# Patient Record
Sex: MALE | Race: WHITE | Employment: UNEMPLOYED | URBAN - METROPOLITAN AREA
[De-identification: names, ages, dates, MRNs, and addresses within clinical notes are randomized per-mention and may not be internally consistent; named-entity substitution may affect disease eponyms.]

---

## 2017-10-14 ENCOUNTER — HOSPITAL ENCOUNTER (OUTPATIENT)
Dept: NON INVASIVE DIAGNOSTICS | Age: 32
Discharge: HOME | End: 2017-10-14
Attending: INTERNAL MEDICINE | Admitting: INTERNAL MEDICINE

## 2017-10-14 LAB
LV EF: 50 %
LVEF MODALITY: NORMAL

## 2018-11-08 ENCOUNTER — APPOINTMENT (OUTPATIENT)
Dept: CT IMAGING | Age: 33
DRG: 134 | End: 2018-11-08
Payer: COMMERCIAL

## 2018-11-08 ENCOUNTER — HOSPITAL ENCOUNTER (INPATIENT)
Age: 33
LOS: 3 days | Discharge: HOME | DRG: 134 | End: 2018-11-11
Attending: EMERGENCY MEDICINE | Admitting: INTERNAL MEDICINE
Payer: COMMERCIAL

## 2018-11-08 ENCOUNTER — APPOINTMENT (OUTPATIENT)
Dept: GENERAL RADIOLOGY | Age: 33
DRG: 134 | End: 2018-11-08
Payer: COMMERCIAL

## 2018-11-08 DIAGNOSIS — I26.99 OTHER ACUTE PULMONARY EMBOLISM WITHOUT ACUTE COR PULMONALE (HCC): Primary | ICD-10-CM

## 2018-11-08 LAB
A/G RATIO: 0.8 (ref 1.1–2.2)
ALBUMIN SERPL-MCNC: 3.4 G/DL (ref 3.4–5)
ALP BLD-CCNC: 50 U/L (ref 40–129)
ALT SERPL-CCNC: 40 U/L (ref 10–40)
ANION GAP SERPL CALCULATED.3IONS-SCNC: 11 MMOL/L (ref 3–16)
APTT: 25.1 SEC (ref 26–36)
APTT: 41.4 SEC (ref 26–36)
AST SERPL-CCNC: 33 U/L (ref 15–37)
BASOPHILS ABSOLUTE: 0.1 K/UL (ref 0–0.2)
BASOPHILS RELATIVE PERCENT: 0.7 %
BILIRUB SERPL-MCNC: 0.6 MG/DL (ref 0–1)
BUN BLDV-MCNC: 8 MG/DL (ref 7–20)
CALCIUM SERPL-MCNC: 9 MG/DL (ref 8.3–10.6)
CHLORIDE BLD-SCNC: 94 MMOL/L (ref 99–110)
CO2: 26 MMOL/L (ref 21–32)
CREAT SERPL-MCNC: 0.8 MG/DL (ref 0.9–1.3)
EOSINOPHILS ABSOLUTE: 0.2 K/UL (ref 0–0.6)
EOSINOPHILS RELATIVE PERCENT: 2.4 %
GFR AFRICAN AMERICAN: >60
GFR NON-AFRICAN AMERICAN: >60
GLOBULIN: 4.2 G/DL
GLUCOSE BLD-MCNC: 98 MG/DL (ref 70–99)
HCT VFR BLD CALC: 38.1 % (ref 40.5–52.5)
HCT VFR BLD CALC: 40.6 % (ref 40.5–52.5)
HEMOGLOBIN: 13 G/DL (ref 13.5–17.5)
HEMOGLOBIN: 13.8 G/DL (ref 13.5–17.5)
INR BLD: 1.11 (ref 0.86–1.14)
LACTIC ACID: 1 MMOL/L (ref 0.4–2)
LYMPHOCYTES ABSOLUTE: 1.2 K/UL (ref 1–5.1)
LYMPHOCYTES RELATIVE PERCENT: 13.7 %
MCH RBC QN AUTO: 29.6 PG (ref 26–34)
MCH RBC QN AUTO: 29.8 PG (ref 26–34)
MCHC RBC AUTO-ENTMCNC: 34 G/DL (ref 31–36)
MCHC RBC AUTO-ENTMCNC: 34.1 G/DL (ref 31–36)
MCV RBC AUTO: 86.7 FL (ref 80–100)
MCV RBC AUTO: 87.8 FL (ref 80–100)
MONOCYTES ABSOLUTE: 0.6 K/UL (ref 0–1.3)
MONOCYTES RELATIVE PERCENT: 7.1 %
NEUTROPHILS ABSOLUTE: 6.5 K/UL (ref 1.7–7.7)
NEUTROPHILS RELATIVE PERCENT: 76.1 %
PDW BLD-RTO: 13.2 % (ref 12.4–15.4)
PDW BLD-RTO: 13.3 % (ref 12.4–15.4)
PLATELET # BLD: 154 K/UL (ref 135–450)
PLATELET # BLD: 164 K/UL (ref 135–450)
PMV BLD AUTO: 9.3 FL (ref 5–10.5)
PMV BLD AUTO: 9.4 FL (ref 5–10.5)
POTASSIUM REFLEX MAGNESIUM: 3.7 MMOL/L (ref 3.5–5.1)
PRO-BNP: 67 PG/ML (ref 0–124)
PROTHROMBIN TIME: 12.6 SEC (ref 9.8–13)
RBC # BLD: 4.34 M/UL (ref 4.2–5.9)
RBC # BLD: 4.68 M/UL (ref 4.2–5.9)
SODIUM BLD-SCNC: 131 MMOL/L (ref 136–145)
TOTAL PROTEIN: 7.6 G/DL (ref 6.4–8.2)
TROPONIN: <0.01 NG/ML
WBC # BLD: 7.2 K/UL (ref 4–11)
WBC # BLD: 8.5 K/UL (ref 4–11)

## 2018-11-08 PROCEDURE — 84484 ASSAY OF TROPONIN QUANT: CPT

## 2018-11-08 PROCEDURE — 87040 BLOOD CULTURE FOR BACTERIA: CPT

## 2018-11-08 PROCEDURE — 6360000002 HC RX W HCPCS: Performed by: INTERNAL MEDICINE

## 2018-11-08 PROCEDURE — 85730 THROMBOPLASTIN TIME PARTIAL: CPT

## 2018-11-08 PROCEDURE — 6360000002 HC RX W HCPCS: Performed by: PHYSICIAN ASSISTANT

## 2018-11-08 PROCEDURE — 2060000000 HC ICU INTERMEDIATE R&B

## 2018-11-08 PROCEDURE — 2580000003 HC RX 258: Performed by: INTERNAL MEDICINE

## 2018-11-08 PROCEDURE — 83880 ASSAY OF NATRIURETIC PEPTIDE: CPT

## 2018-11-08 PROCEDURE — 6370000000 HC RX 637 (ALT 250 FOR IP): Performed by: EMERGENCY MEDICINE

## 2018-11-08 PROCEDURE — 85027 COMPLETE CBC AUTOMATED: CPT

## 2018-11-08 PROCEDURE — 94640 AIRWAY INHALATION TREATMENT: CPT

## 2018-11-08 PROCEDURE — 85025 COMPLETE CBC W/AUTO DIFF WBC: CPT

## 2018-11-08 PROCEDURE — 94664 DEMO&/EVAL PT USE INHALER: CPT

## 2018-11-08 PROCEDURE — 6370000000 HC RX 637 (ALT 250 FOR IP): Performed by: INTERNAL MEDICINE

## 2018-11-08 PROCEDURE — 36415 COLL VENOUS BLD VENIPUNCTURE: CPT

## 2018-11-08 PROCEDURE — 83605 ASSAY OF LACTIC ACID: CPT

## 2018-11-08 PROCEDURE — 6360000004 HC RX CONTRAST MEDICATION: Performed by: PHYSICIAN ASSISTANT

## 2018-11-08 PROCEDURE — 2580000003 HC RX 258: Performed by: EMERGENCY MEDICINE

## 2018-11-08 PROCEDURE — 93010 ELECTROCARDIOGRAM REPORT: CPT | Performed by: INTERNAL MEDICINE

## 2018-11-08 PROCEDURE — 87801 DETECT AGNT MULT DNA AMPLI: CPT

## 2018-11-08 PROCEDURE — 85610 PROTHROMBIN TIME: CPT

## 2018-11-08 PROCEDURE — 93005 ELECTROCARDIOGRAM TRACING: CPT | Performed by: PHYSICIAN ASSISTANT

## 2018-11-08 PROCEDURE — 99223 1ST HOSP IP/OBS HIGH 75: CPT | Performed by: INTERNAL MEDICINE

## 2018-11-08 PROCEDURE — 96361 HYDRATE IV INFUSION ADD-ON: CPT

## 2018-11-08 PROCEDURE — 96375 TX/PRO/DX INJ NEW DRUG ADDON: CPT

## 2018-11-08 PROCEDURE — 80053 COMPREHEN METABOLIC PANEL: CPT

## 2018-11-08 PROCEDURE — 99285 EMERGENCY DEPT VISIT HI MDM: CPT

## 2018-11-08 PROCEDURE — 71260 CT THORAX DX C+: CPT

## 2018-11-08 PROCEDURE — 96366 THER/PROPH/DIAG IV INF ADDON: CPT

## 2018-11-08 PROCEDURE — 96365 THER/PROPH/DIAG IV INF INIT: CPT

## 2018-11-08 RX ORDER — SODIUM CHLORIDE 0.9 % (FLUSH) 0.9 %
10 SYRINGE (ML) INJECTION PRN
Status: DISCONTINUED | OUTPATIENT
Start: 2018-11-08 | End: 2018-11-11 | Stop reason: HOSPADM

## 2018-11-08 RX ORDER — SODIUM CHLORIDE 0.9 % (FLUSH) 0.9 %
10 SYRINGE (ML) INJECTION EVERY 12 HOURS SCHEDULED
Status: DISCONTINUED | OUTPATIENT
Start: 2018-11-08 | End: 2018-11-11 | Stop reason: HOSPADM

## 2018-11-08 RX ORDER — PROPRANOLOL HYDROCHLORIDE 20 MG/1
20 TABLET ORAL 3 TIMES DAILY
Status: DISCONTINUED | OUTPATIENT
Start: 2018-11-08 | End: 2018-11-11 | Stop reason: HOSPADM

## 2018-11-08 RX ORDER — PAROXETINE 10 MG/1
10 TABLET, FILM COATED ORAL EVERY MORNING
Status: DISCONTINUED | OUTPATIENT
Start: 2018-11-09 | End: 2018-11-11 | Stop reason: HOSPADM

## 2018-11-08 RX ORDER — HEPARIN SODIUM 1000 [USP'U]/ML
6500 INJECTION, SOLUTION INTRAVENOUS; SUBCUTANEOUS ONCE
Status: COMPLETED | OUTPATIENT
Start: 2018-11-08 | End: 2018-11-08

## 2018-11-08 RX ORDER — IPRATROPIUM BROMIDE AND ALBUTEROL SULFATE 2.5; .5 MG/3ML; MG/3ML
1 SOLUTION RESPIRATORY (INHALATION) ONCE
Status: COMPLETED | OUTPATIENT
Start: 2018-11-08 | End: 2018-11-08

## 2018-11-08 RX ORDER — LORAZEPAM 2 MG/ML
0.5 INJECTION INTRAMUSCULAR ONCE
Status: COMPLETED | OUTPATIENT
Start: 2018-11-08 | End: 2018-11-08

## 2018-11-08 RX ORDER — ONDANSETRON 2 MG/ML
4 INJECTION INTRAMUSCULAR; INTRAVENOUS EVERY 6 HOURS PRN
Status: DISCONTINUED | OUTPATIENT
Start: 2018-11-08 | End: 2018-11-11 | Stop reason: HOSPADM

## 2018-11-08 RX ORDER — HEPARIN SODIUM 1000 [USP'U]/ML
3200 INJECTION, SOLUTION INTRAVENOUS; SUBCUTANEOUS ONCE
Status: COMPLETED | OUTPATIENT
Start: 2018-11-08 | End: 2018-11-08

## 2018-11-08 RX ORDER — IBUPROFEN 400 MG/1
800 TABLET ORAL EVERY 8 HOURS PRN
Status: DISCONTINUED | OUTPATIENT
Start: 2018-11-08 | End: 2018-11-08

## 2018-11-08 RX ORDER — DOCUSATE SODIUM 100 MG/1
100 CAPSULE, LIQUID FILLED ORAL 2 TIMES DAILY
Status: DISCONTINUED | OUTPATIENT
Start: 2018-11-08 | End: 2018-11-11 | Stop reason: HOSPADM

## 2018-11-08 RX ORDER — HEPARIN SODIUM 1000 [USP'U]/ML
80 INJECTION, SOLUTION INTRAVENOUS; SUBCUTANEOUS PRN
Status: DISCONTINUED | OUTPATIENT
Start: 2018-11-08 | End: 2018-11-08

## 2018-11-08 RX ORDER — METHADONE HYDROCHLORIDE 5 MG/5ML
83 SOLUTION ORAL DAILY
Status: DISCONTINUED | OUTPATIENT
Start: 2018-11-09 | End: 2018-11-09 | Stop reason: ALTCHOICE

## 2018-11-08 RX ORDER — 0.9 % SODIUM CHLORIDE 0.9 %
30 INTRAVENOUS SOLUTION INTRAVENOUS ONCE
Status: COMPLETED | OUTPATIENT
Start: 2018-11-08 | End: 2018-11-08

## 2018-11-08 RX ORDER — FAMOTIDINE 20 MG/1
20 TABLET, FILM COATED ORAL 2 TIMES DAILY
Status: DISCONTINUED | OUTPATIENT
Start: 2018-11-08 | End: 2018-11-11 | Stop reason: HOSPADM

## 2018-11-08 RX ORDER — HEPARIN SODIUM 1000 [USP'U]/ML
40 INJECTION, SOLUTION INTRAVENOUS; SUBCUTANEOUS PRN
Status: DISCONTINUED | OUTPATIENT
Start: 2018-11-08 | End: 2018-11-08

## 2018-11-08 RX ORDER — KETOROLAC TROMETHAMINE 30 MG/ML
30 INJECTION, SOLUTION INTRAMUSCULAR; INTRAVENOUS EVERY 6 HOURS PRN
Status: DISCONTINUED | OUTPATIENT
Start: 2018-11-08 | End: 2018-11-11 | Stop reason: HOSPADM

## 2018-11-08 RX ORDER — PROPRANOLOL HYDROCHLORIDE 10 MG/1
10 TABLET ORAL ONCE
Status: COMPLETED | OUTPATIENT
Start: 2018-11-08 | End: 2018-11-08

## 2018-11-08 RX ORDER — CLONAZEPAM 0.5 MG/1
0.5 TABLET ORAL 4 TIMES DAILY PRN
Status: DISCONTINUED | OUTPATIENT
Start: 2018-11-08 | End: 2018-11-11 | Stop reason: HOSPADM

## 2018-11-08 RX ORDER — KETOROLAC TROMETHAMINE 30 MG/ML
30 INJECTION, SOLUTION INTRAMUSCULAR; INTRAVENOUS ONCE
Status: COMPLETED | OUTPATIENT
Start: 2018-11-08 | End: 2018-11-08

## 2018-11-08 RX ORDER — HEPARIN SODIUM 10000 [USP'U]/100ML
17.8 INJECTION, SOLUTION INTRAVENOUS CONTINUOUS
Status: DISPENSED | OUTPATIENT
Start: 2018-11-08 | End: 2018-11-11

## 2018-11-08 RX ADMIN — Medication 10 ML: at 20:46

## 2018-11-08 RX ADMIN — LORAZEPAM 0.5 MG: 2 INJECTION INTRAMUSCULAR; INTRAVENOUS at 15:04

## 2018-11-08 RX ADMIN — FAMOTIDINE 20 MG: 20 TABLET ORAL at 20:46

## 2018-11-08 RX ADMIN — IBUPROFEN 800 MG: 400 TABLET ORAL at 18:04

## 2018-11-08 RX ADMIN — HEPARIN SODIUM 6500 UNITS: 1000 INJECTION INTRAVENOUS; SUBCUTANEOUS at 15:05

## 2018-11-08 RX ADMIN — SODIUM CHLORIDE 1000 ML: 9 INJECTION, SOLUTION INTRAVENOUS at 12:44

## 2018-11-08 RX ADMIN — IPRATROPIUM BROMIDE AND ALBUTEROL SULFATE 1 AMPULE: .5; 3 SOLUTION RESPIRATORY (INHALATION) at 12:57

## 2018-11-08 RX ADMIN — PROPRANOLOL HYDROCHLORIDE 10 MG: 10 TABLET ORAL at 15:27

## 2018-11-08 RX ADMIN — HEPARIN SODIUM 14.6 ML/HR: 10000 INJECTION, SOLUTION INTRAVENOUS at 15:05

## 2018-11-08 RX ADMIN — IOPAMIDOL 75 ML: 755 INJECTION, SOLUTION INTRAVENOUS at 13:24

## 2018-11-08 RX ADMIN — CLONAZEPAM 0.5 MG: 0.5 TABLET ORAL at 18:04

## 2018-11-08 RX ADMIN — PROPRANOLOL HYDROCHLORIDE 20 MG: 20 TABLET ORAL at 20:46

## 2018-11-08 RX ADMIN — HEPARIN SODIUM 3200 UNITS: 1000 INJECTION INTRAVENOUS; SUBCUTANEOUS at 23:11

## 2018-11-08 RX ADMIN — KETOROLAC TROMETHAMINE 30 MG: 30 INJECTION, SOLUTION INTRAMUSCULAR at 15:27

## 2018-11-08 RX ADMIN — DOCUSATE SODIUM 100 MG: 100 CAPSULE, LIQUID FILLED ORAL at 20:46

## 2018-11-08 ASSESSMENT — PAIN DESCRIPTION - FREQUENCY
FREQUENCY: CONTINUOUS

## 2018-11-08 ASSESSMENT — PAIN SCALES - GENERAL
PAINLEVEL_OUTOF10: 6
PAINLEVEL_OUTOF10: 5
PAINLEVEL_OUTOF10: 6
PAINLEVEL_OUTOF10: 7
PAINLEVEL_OUTOF10: 7
PAINLEVEL_OUTOF10: 5
PAINLEVEL_OUTOF10: 5

## 2018-11-08 ASSESSMENT — ENCOUNTER SYMPTOMS
CHEST TIGHTNESS: 1
ABDOMINAL PAIN: 0
WHEEZING: 0
VOMITING: 0
SHORTNESS OF BREATH: 1
NAUSEA: 0
COUGH: 1
COLOR CHANGE: 0

## 2018-11-08 ASSESSMENT — PAIN DESCRIPTION - LOCATION
LOCATION: RIB CAGE
LOCATION: BACK;RIB CAGE

## 2018-11-08 ASSESSMENT — PAIN DESCRIPTION - PAIN TYPE
TYPE: ACUTE PAIN
TYPE: CHRONIC PAIN

## 2018-11-08 NOTE — ED PROVIDER NOTES
person, place, and time. Coordination normal.   Skin: Skin is warm and dry. No rash (On exposed body surfaces) noted. He is not diaphoretic. Psychiatric: He has a normal mood and affect. His behavior is normal. Thought content normal.     EKG visualized interpreted by myself shows sinus tachycardia at a rate of 130. The axis is 51. There is a right bundle branch block. Q waves in the inferior leads and lateral precordial leads. There is no significant ST elevation but there are nonspecific T-wave findings throughout. 1. Other acute pulmonary embolism without acute cor pulmonale (HCC)          DISPOSITION/PLAN  PATIENT REFERRED TO:  No follow-up provider specified.   DISCHARGE MEDICATIONS:  New Prescriptions    No medications on file         MD Justin Mosquera MD  11/08/18 0116

## 2018-11-08 NOTE — PROGRESS NOTES
Clinical Pharmacy Note  Heparin Dosing Consult    Radha Allen III is a 34 y.o. male ordered heparin per high dose nomogram by Antwan GARVEY. Lab Results   Component Value Date    APTT 25.1 11/08/2018     Lab Results   Component Value Date    HGB 13.8 11/08/2018    HCT 40.6 11/08/2018     11/08/2018    INR 1.11 11/08/2018       Ht Readings from Last 1 Encounters:   11/08/18 5' 10\" (1.778 m)        Wt Readings from Last 1 Encounters:   11/08/18 206 lb 2.1 oz (93.5 kg)     Dosing weight: 81.2 kg    Assessment/Plan:  Initial bolus: 6500 units  Initial infusion rate: 14.6 mL/hr  Next aPTT: 2100 on 11-8-18    Pharmacy will continue to monitor adjust heparin based on aPTT results using nomogram below:     HIGH DOSE HEPARIN PROTOCOL (DVT/PE)     Initial Bolus: 80 units/kg Max Bolus: 10,000 units       Initial Rate: 18 units/kg/hr Max Initial Rate: 2,100 units/hr     aPTT < 36   Heparin 80 units/kg bolus Increase infusion by 4 units/kg/hr       (maximum 10,000 units)   aPTT 37-53   Heparin 40 units/kg bolus Increase infusion by 2 units/kg/hr       (maximum 5,000 units)   aPTT 54-90   No bolus   No change   aPTT 91-98   No bolus   Decrease infusion by 2 units/kg/hr   aPTT    Hold heparin for 1 hour Decrease infusion by 3 units/kg/hr   aPTT 108-115  Hold heparin for 1 hour Decrease infusion by 4 units/kg/hr   aPTT > 116   Hold heparin for 1 hour Decrease infusion by 6 units/kg/hr     Obtain aPTT 6 hours after initial bolus and 6 hours after any dose change until  two consecutive therapeutic aPTTs are achieved - then daily.   Miriam Ott [facility]  11/8/2018  3:41 PM

## 2018-11-09 LAB
APTT: 51.8 SEC (ref 26–36)
APTT: 54.7 SEC (ref 26–36)
APTT: 76.1 SEC (ref 26–36)
BASOPHILS ABSOLUTE: 0 K/UL (ref 0–0.2)
BASOPHILS RELATIVE PERCENT: 0.6 %
EOSINOPHILS ABSOLUTE: 0.4 K/UL (ref 0–0.6)
EOSINOPHILS RELATIVE PERCENT: 6.8 %
HCT VFR BLD CALC: 36 % (ref 40.5–52.5)
HEMOGLOBIN: 12 G/DL (ref 13.5–17.5)
LYMPHOCYTES ABSOLUTE: 2.2 K/UL (ref 1–5.1)
LYMPHOCYTES RELATIVE PERCENT: 38.3 %
MCH RBC QN AUTO: 29.2 PG (ref 26–34)
MCHC RBC AUTO-ENTMCNC: 33.5 G/DL (ref 31–36)
MCV RBC AUTO: 87.2 FL (ref 80–100)
MONOCYTES ABSOLUTE: 0.5 K/UL (ref 0–1.3)
MONOCYTES RELATIVE PERCENT: 9 %
NEUTROPHILS ABSOLUTE: 2.5 K/UL (ref 1.7–7.7)
NEUTROPHILS RELATIVE PERCENT: 45.3 %
PDW BLD-RTO: 13.3 % (ref 12.4–15.4)
PLATELET # BLD: 168 K/UL (ref 135–450)
PMV BLD AUTO: 9.1 FL (ref 5–10.5)
PROCALCITONIN: 0.57 NG/ML (ref 0–0.15)
RBC # BLD: 4.13 M/UL (ref 4.2–5.9)
REPORT: NORMAL
WBC # BLD: 5.6 K/UL (ref 4–11)

## 2018-11-09 PROCEDURE — 36415 COLL VENOUS BLD VENIPUNCTURE: CPT

## 2018-11-09 PROCEDURE — 6360000002 HC RX W HCPCS: Performed by: INTERNAL MEDICINE

## 2018-11-09 PROCEDURE — 85610 PROTHROMBIN TIME: CPT

## 2018-11-09 PROCEDURE — 99232 SBSQ HOSP IP/OBS MODERATE 35: CPT | Performed by: INTERNAL MEDICINE

## 2018-11-09 PROCEDURE — 85305 CLOT INHIBIT PROT S TOTAL: CPT

## 2018-11-09 PROCEDURE — 85730 THROMBOPLASTIN TIME PARTIAL: CPT

## 2018-11-09 PROCEDURE — 6370000000 HC RX 637 (ALT 250 FOR IP): Performed by: INTERNAL MEDICINE

## 2018-11-09 PROCEDURE — 84145 PROCALCITONIN (PCT): CPT

## 2018-11-09 PROCEDURE — 86146 BETA-2 GLYCOPROTEIN ANTIBODY: CPT

## 2018-11-09 PROCEDURE — 93970 EXTREMITY STUDY: CPT

## 2018-11-09 PROCEDURE — 85303 CLOT INHIBIT PROT C ACTIVITY: CPT

## 2018-11-09 PROCEDURE — 85025 COMPLETE CBC W/AUTO DIFF WBC: CPT

## 2018-11-09 PROCEDURE — 99223 1ST HOSP IP/OBS HIGH 75: CPT | Performed by: INTERNAL MEDICINE

## 2018-11-09 PROCEDURE — 86147 CARDIOLIPIN ANTIBODY EA IG: CPT

## 2018-11-09 PROCEDURE — 81240 F2 GENE: CPT

## 2018-11-09 PROCEDURE — 87040 BLOOD CULTURE FOR BACTERIA: CPT

## 2018-11-09 PROCEDURE — 85302 CLOT INHIBIT PROT C ANTIGEN: CPT

## 2018-11-09 PROCEDURE — 2580000003 HC RX 258: Performed by: INTERNAL MEDICINE

## 2018-11-09 PROCEDURE — 85613 RUSSELL VIPER VENOM DILUTED: CPT

## 2018-11-09 PROCEDURE — 81241 F5 GENE: CPT

## 2018-11-09 PROCEDURE — 6360000002 HC RX W HCPCS: Performed by: PHYSICIAN ASSISTANT

## 2018-11-09 PROCEDURE — 85670 THROMBIN TIME PLASMA: CPT

## 2018-11-09 PROCEDURE — 2060000000 HC ICU INTERMEDIATE R&B

## 2018-11-09 PROCEDURE — 85635 REPTILASE TEST: CPT

## 2018-11-09 PROCEDURE — 85306 CLOT INHIBIT PROT S FREE: CPT

## 2018-11-09 RX ORDER — HEPARIN SODIUM 1000 [USP'U]/ML
3200 INJECTION, SOLUTION INTRAVENOUS; SUBCUTANEOUS ONCE
Status: COMPLETED | OUTPATIENT
Start: 2018-11-09 | End: 2018-11-09

## 2018-11-09 RX ORDER — METHADONE HYDROCHLORIDE 10 MG/1
80 TABLET ORAL DAILY
Status: DISCONTINUED | OUTPATIENT
Start: 2018-11-10 | End: 2018-11-11 | Stop reason: HOSPADM

## 2018-11-09 RX ADMIN — PROPRANOLOL HYDROCHLORIDE 20 MG: 20 TABLET ORAL at 20:48

## 2018-11-09 RX ADMIN — DOCUSATE SODIUM 100 MG: 100 CAPSULE, LIQUID FILLED ORAL at 20:49

## 2018-11-09 RX ADMIN — PROPRANOLOL HYDROCHLORIDE 20 MG: 20 TABLET ORAL at 14:27

## 2018-11-09 RX ADMIN — KETOROLAC TROMETHAMINE 30 MG: 30 INJECTION, SOLUTION INTRAMUSCULAR at 20:48

## 2018-11-09 RX ADMIN — DOCUSATE SODIUM 100 MG: 100 CAPSULE, LIQUID FILLED ORAL at 08:12

## 2018-11-09 RX ADMIN — HEPARIN SODIUM 17.8 ML/HR: 10000 INJECTION, SOLUTION INTRAVENOUS at 22:11

## 2018-11-09 RX ADMIN — METHADONE HYDROCHLORIDE 83 MG: 5 SOLUTION ORAL at 08:33

## 2018-11-09 RX ADMIN — KETOROLAC TROMETHAMINE 30 MG: 30 INJECTION, SOLUTION INTRAMUSCULAR at 00:19

## 2018-11-09 RX ADMIN — PROPRANOLOL HYDROCHLORIDE 20 MG: 20 TABLET ORAL at 08:12

## 2018-11-09 RX ADMIN — CLONAZEPAM 0.5 MG: 0.5 TABLET ORAL at 20:48

## 2018-11-09 RX ADMIN — HEPARIN SODIUM 16.2 ML/HR: 10000 INJECTION, SOLUTION INTRAVENOUS at 07:17

## 2018-11-09 RX ADMIN — FAMOTIDINE 20 MG: 20 TABLET ORAL at 08:12

## 2018-11-09 RX ADMIN — LEVOFLOXACIN 750 MG: 500 TABLET, FILM COATED ORAL at 08:33

## 2018-11-09 RX ADMIN — CLONAZEPAM 0.5 MG: 0.5 TABLET ORAL at 12:10

## 2018-11-09 RX ADMIN — KETOROLAC TROMETHAMINE 30 MG: 30 INJECTION, SOLUTION INTRAMUSCULAR at 14:22

## 2018-11-09 RX ADMIN — ONDANSETRON 4 MG: 2 INJECTION INTRAMUSCULAR; INTRAVENOUS at 08:43

## 2018-11-09 RX ADMIN — KETOROLAC TROMETHAMINE 30 MG: 30 INJECTION, SOLUTION INTRAMUSCULAR at 08:12

## 2018-11-09 RX ADMIN — HEPARIN SODIUM 3200 UNITS: 1000 INJECTION INTRAVENOUS; SUBCUTANEOUS at 14:21

## 2018-11-09 RX ADMIN — Medication 10 ML: at 20:48

## 2018-11-09 RX ADMIN — FAMOTIDINE 20 MG: 20 TABLET ORAL at 20:48

## 2018-11-09 RX ADMIN — PAROXETINE HYDROCHLORIDE 10 MG: 10 TABLET, FILM COATED ORAL at 08:13

## 2018-11-09 ASSESSMENT — PAIN SCALES - GENERAL
PAINLEVEL_OUTOF10: 7
PAINLEVEL_OUTOF10: 5
PAINLEVEL_OUTOF10: 5
PAINLEVEL_OUTOF10: 7
PAINLEVEL_OUTOF10: 6
PAINLEVEL_OUTOF10: 7

## 2018-11-09 ASSESSMENT — PAIN DESCRIPTION - LOCATION: LOCATION: BACK;RIB CAGE

## 2018-11-09 ASSESSMENT — PAIN DESCRIPTION - PAIN TYPE: TYPE: CHRONIC PAIN

## 2018-11-09 ASSESSMENT — PAIN SCALES - WONG BAKER
WONGBAKER_NUMERICALRESPONSE: 2
WONGBAKER_NUMERICALRESPONSE: 4

## 2018-11-09 NOTE — H&P
tibialis 2+. Abdomen: Soft, non tender to palpation. Active bowel sounds x 4 quadrants. Musculoskeletal: Limitation of the rom of the joints and LSS  SLR is positive on the affected side  No gross weak ness appreciated  CNS: Oriented to person, place and time. CXR:   CT CHEST PULMONARY EMBOLISM W CONTRAST   Final Result   1. Bilateral pulmonary emboli most pronounced in the left lower lobe as   described. 2. Mild bilateral pleural effusions. Bilateral lower lobe/basilar edema and   multifocal atelectasis. 3. Moderate size hiatal hernia. 4. Likely hepatic fatty infiltration. Critical results were called by Dr. Raman Angulo. Jaki Lin MD to Renae Jacobs on   11/8/2018 at 14:06. EKG:  I have reviewed the EKG. CBC   Recent Labs      11/08/18   1153  11/08/18   1731   WBC  8.5  7.2   HGB  13.8  13.0*   HCT  40.6  38.1*   PLT  164  154      RENAL  Recent Labs      11/08/18   1153   NA  131*   K  3.7   CL  94*   CO2  26   BUN  8   CREATININE  0.8*     LFT'S  Recent Labs      11/08/18   1153   AST  33   ALT  40   BILITOT  0.6   ALKPHOS  50     COAG  Recent Labs      11/08/18   1153   INR  1.11     CARDIAC ENZYMES  Recent Labs      11/08/18   1153   TROPONINI  <0.01       U/A:  No results found for: NITRITE, COLORU, WBCUA, RBCUA, MUCUS, BACTERIA, CLARITYU, SPECGRAV, LEUKOCYTESUR, BLOODU, GLUCOSEU, AMORPHOUS    ABG  No results found for: NZH8GDG, BEART, M0FSWOHY, PHART, THGBART, OLS8YZK, PO2ART, JZR4SXL        Active Hospital Problems    Diagnosis Date Noted    PE (pulmonary thromboembolism) ([facility] [address] [I26.99] 11/08/2018           ASSESSMENT/PLAN:  PE- on heparin  Dyspnea-stable  Family h/o clotting disorder- will ask for hematology  Consult  Anxiety- will increase the frequency of the klonipin    Pt is stable. Discussed with staff and pt about the plan. See the orders for further plan. Will proceed further acc to pt's progress.       Electronically signed by Alex Marcos MD on 11/8/2018 at 11:43

## 2018-11-09 NOTE — CONSULTS
OF SYSTEMS:  He denies any recent fevers, chills, sweats, nausea,  vomiting, abdominal pain, any new bone aches, dysphagia, odynophagia,  diarrhea, constipation, hemoptysis, hematemesis, change in  vision/hearing/smell/taste, weakness, neuropathy, skin rashes,  productive cough, urinary or bowel prolapse or incontinence, petechiae,  purpura, skin rashes, pruritus, hallucinations, nasal congestion or  drainage, seizures, strokes, syncope, depression, suicidal ideations,  melena, or hematochezia. He has pleuritic bilateral chest pain as  above. His 10-system review of systems is otherwise negative. PHYSICAL EXAMINATION:  VITAL SIGNS:  He is afebrile with normal vital signs. GENERAL:  He is in no acute distress. HEENT:  His pupils are round and reactive to light and accommodation. Extraocular muscles are intact. NECK:  He has no jugular venous distention. No thyromegaly. His  oropharynx is clear. He has no carotid bruits. He has no palpable  cervical, supraclavicular, or axillary lymphadenopathy. HEART:  Regular rate and rhythm. LUNGS:  Clear to auscultation bilaterally. ABDOMEN:  Nondistended, nontender with bowel sounds x4. No  hepatosplenomegaly. EXTREMITIES:  He has no peripheral clubbing, cyanosis, or edema. NEUROLOGIC:  Exam is nonfocal.    LABORATORY DATA:  His white blood count is 5.6, hemoglobin 12, platelets  of 685. ASSESSMENT AND PLAN:  Unprovoked pulmonary emboli. This was definitely  unprovoked. He denies any recent long car rides or plane rides or  hospitalizations or surgeries. I typically do 6 to 12 months of  anticoagulation for the first unprovoked clot. I will make my final  recommendation once the hypercoag profile is back. I will check  everything but antithrombin 3 which I cannot check since he is in  heparin. He does not have any signs or symptoms of an occult  malignancy. He should see me one to two weeks after discharge to review  his hypercoag profile.   I will
atelectasis. No pneumothorax. Upper Abdomen: Moderate size hiatal hernia. Likely hepatic fatty  infiltration. Soft Tissues/Bones: No significant osseous or soft tissue abnormality. Impression 1. Bilateral pulmonary emboli most pronounced in the left lower lobe as  described. 2. Mild bilateral pleural effusions. Bilateral lower lobe/basilar edema and  multifocal atelectasis. 3. Moderate size hiatal hernia. 4. Likely hepatic fatty infiltration. Critical results were called by Dr. Livier Bland. Charlette Campbell MD to Gustavo Garcia on  11/8/2018 at 14:06. CXR PA/LAT: No results found for this or any previous visit. CXR portable: No results found for this or any previous visit. Assessment:        Pulmonary embolism  Pulmonary infarction  Pleural effusion   Hiatal hernia  GERD  Drug abuse      Plan:      Pulmonary embolism  -this appears to be unprovoked pulmonary embolism. started on  heparin drip. Okay to transition over to oral medications. - since oncology consult, will defer timing and during of change to oral therapy. Atelectasis  -pulmonary infarct versus pneumonia. With hiatal hernia, concern for aspiration.  - add Procalcitonin to morning labs  - Start antibiotics. Pleural effusions  -monitor. To small for intervention  - repeat chest X-ray tomorrow. Nutrition  - DIET GENERAL; This note was transcribed using [id number] [employer]. Please disregard any translational errors.     Thank you for the consult    Sabina Lloyd [facility]  [phone]

## 2018-11-09 NOTE — PROGRESS NOTES
Clinical Pharmacy Note  Heparin Dosing       Lab Results   Component Value Date    APTT 41.4 11/08/2018     Lab Results   Component Value Date    HGB 13.0 11/08/2018    HCT 38.1 11/08/2018     11/08/2018    INR 1.11 11/08/2018       Current Infusion Rate: 14.6 mL/hr    Plan:  Bolus: 3200 units  Rate: increase to 16.2 mL/hr  Next aPTT: 0600 11/9/18    Pharmacy will continue to monitor and adjust based on aPTT results.   Diann Garcia, PharmD

## 2018-11-10 ENCOUNTER — APPOINTMENT (OUTPATIENT)
Dept: GENERAL RADIOLOGY | Age: 33
DRG: 134 | End: 2018-11-10
Payer: COMMERCIAL

## 2018-11-10 LAB
A/G RATIO: 0.9 (ref 1.1–2.2)
ALBUMIN SERPL-MCNC: 3 G/DL (ref 3.4–5)
ALP BLD-CCNC: 49 U/L (ref 40–129)
ALT SERPL-CCNC: 79 U/L (ref 10–40)
ANION GAP SERPL CALCULATED.3IONS-SCNC: 10 MMOL/L (ref 3–16)
APTT: 61.3 SEC (ref 26–36)
AST SERPL-CCNC: 74 U/L (ref 15–37)
BILIRUB SERPL-MCNC: 0.3 MG/DL (ref 0–1)
BUN BLDV-MCNC: 8 MG/DL (ref 7–20)
CALCIUM SERPL-MCNC: 8.4 MG/DL (ref 8.3–10.6)
CHLORIDE BLD-SCNC: 105 MMOL/L (ref 99–110)
CO2: 26 MMOL/L (ref 21–32)
CREAT SERPL-MCNC: 0.7 MG/DL (ref 0.9–1.3)
GFR AFRICAN AMERICAN: >60
GFR NON-AFRICAN AMERICAN: >60
GLOBULIN: 3.2 G/DL
GLUCOSE BLD-MCNC: 88 MG/DL (ref 70–99)
HCT VFR BLD CALC: 34.5 % (ref 40.5–52.5)
HEMOGLOBIN: 11.6 G/DL (ref 13.5–17.5)
MCH RBC QN AUTO: 29.7 PG (ref 26–34)
MCHC RBC AUTO-ENTMCNC: 33.8 G/DL (ref 31–36)
MCV RBC AUTO: 88 FL (ref 80–100)
PDW BLD-RTO: 13.5 % (ref 12.4–15.4)
PLATELET # BLD: 202 K/UL (ref 135–450)
PMV BLD AUTO: 9.1 FL (ref 5–10.5)
POTASSIUM SERPL-SCNC: 3.9 MMOL/L (ref 3.5–5.1)
RBC # BLD: 3.92 M/UL (ref 4.2–5.9)
SODIUM BLD-SCNC: 141 MMOL/L (ref 136–145)
TOTAL PROTEIN: 6.2 G/DL (ref 6.4–8.2)
WBC # BLD: 4.3 K/UL (ref 4–11)

## 2018-11-10 PROCEDURE — 71046 X-RAY EXAM CHEST 2 VIEWS: CPT

## 2018-11-10 PROCEDURE — 99232 SBSQ HOSP IP/OBS MODERATE 35: CPT | Performed by: NURSE PRACTITIONER

## 2018-11-10 PROCEDURE — 6360000002 HC RX W HCPCS: Performed by: INTERNAL MEDICINE

## 2018-11-10 PROCEDURE — 80053 COMPREHEN METABOLIC PANEL: CPT

## 2018-11-10 PROCEDURE — 2060000000 HC ICU INTERMEDIATE R&B

## 2018-11-10 PROCEDURE — 94760 N-INVAS EAR/PLS OXIMETRY 1: CPT

## 2018-11-10 PROCEDURE — 85027 COMPLETE CBC AUTOMATED: CPT

## 2018-11-10 PROCEDURE — 6370000000 HC RX 637 (ALT 250 FOR IP): Performed by: INTERNAL MEDICINE

## 2018-11-10 PROCEDURE — 2580000003 HC RX 258: Performed by: INTERNAL MEDICINE

## 2018-11-10 PROCEDURE — G0008 ADMIN INFLUENZA VIRUS VAC: HCPCS | Performed by: INTERNAL MEDICINE

## 2018-11-10 PROCEDURE — 90686 IIV4 VACC NO PRSV 0.5 ML IM: CPT | Performed by: INTERNAL MEDICINE

## 2018-11-10 PROCEDURE — 36415 COLL VENOUS BLD VENIPUNCTURE: CPT

## 2018-11-10 PROCEDURE — 85730 THROMBOPLASTIN TIME PARTIAL: CPT

## 2018-11-10 RX ADMIN — PAROXETINE HYDROCHLORIDE 10 MG: 10 TABLET, FILM COATED ORAL at 09:20

## 2018-11-10 RX ADMIN — FAMOTIDINE 20 MG: 20 TABLET ORAL at 09:20

## 2018-11-10 RX ADMIN — INFLUENZA A VIRUS A/MICHIGAN/45/2015 X-275 (H1N1) ANTIGEN (FORMALDEHYDE INACTIVATED), INFLUENZA A VIRUS A/SINGAPORE/INFIMH-16-0019/2016 IVR-186 (H3N2) ANTIGEN (FORMALDEHYDE INACTIVATED), INFLUENZA B VIRUS B/PHUKET/3073/2013 ANTIGEN (FORMALDEHYDE INACTIVATED), AND INFLUENZA B VIRUS B/MARYLAND/15/2016 BX-69A ANTIGEN (FORMALDEHYDE INACTIVATED) 0.5 ML: 15; 15; 15; 15 INJECTION, SUSPENSION INTRAMUSCULAR at 09:22

## 2018-11-10 RX ADMIN — METHADONE HYDROCHLORIDE 80 MG: 10 TABLET ORAL at 09:20

## 2018-11-10 RX ADMIN — KETOROLAC TROMETHAMINE 30 MG: 30 INJECTION, SOLUTION INTRAMUSCULAR at 04:58

## 2018-11-10 RX ADMIN — ONDANSETRON 4 MG: 2 INJECTION INTRAMUSCULAR; INTRAVENOUS at 18:18

## 2018-11-10 RX ADMIN — PROPRANOLOL HYDROCHLORIDE 20 MG: 20 TABLET ORAL at 09:20

## 2018-11-10 RX ADMIN — FAMOTIDINE 20 MG: 20 TABLET ORAL at 21:31

## 2018-11-10 RX ADMIN — PROPRANOLOL HYDROCHLORIDE 20 MG: 20 TABLET ORAL at 14:12

## 2018-11-10 RX ADMIN — CLONAZEPAM 0.5 MG: 0.5 TABLET ORAL at 09:52

## 2018-11-10 RX ADMIN — DOCUSATE SODIUM 100 MG: 100 CAPSULE, LIQUID FILLED ORAL at 21:31

## 2018-11-10 RX ADMIN — PROPRANOLOL HYDROCHLORIDE 20 MG: 20 TABLET ORAL at 21:31

## 2018-11-10 RX ADMIN — Medication 10 ML: at 09:22

## 2018-11-10 RX ADMIN — DOCUSATE SODIUM 100 MG: 100 CAPSULE, LIQUID FILLED ORAL at 09:20

## 2018-11-10 RX ADMIN — KETOROLAC TROMETHAMINE 30 MG: 30 INJECTION, SOLUTION INTRAMUSCULAR at 11:50

## 2018-11-10 RX ADMIN — CLONAZEPAM 0.5 MG: 0.5 TABLET ORAL at 18:18

## 2018-11-10 RX ADMIN — KETOROLAC TROMETHAMINE 30 MG: 30 INJECTION, SOLUTION INTRAMUSCULAR at 18:18

## 2018-11-10 RX ADMIN — LEVOFLOXACIN 750 MG: 500 TABLET, FILM COATED ORAL at 09:20

## 2018-11-10 RX ADMIN — ONDANSETRON 4 MG: 2 INJECTION INTRAMUSCULAR; INTRAVENOUS at 09:50

## 2018-11-10 ASSESSMENT — PAIN SCALES - GENERAL
PAINLEVEL_OUTOF10: 7
PAINLEVEL_OUTOF10: 6
PAINLEVEL_OUTOF10: 0
PAINLEVEL_OUTOF10: 7
PAINLEVEL_OUTOF10: 6
PAINLEVEL_OUTOF10: 6

## 2018-11-10 NOTE — PROGRESS NOTES
Department of Internal Medicine  General Internal Medicine  Attending Progress Note      SUBJECTIVE:  Pt is doing well,less sob, has some lateral chest pain on and off. Afebrile,    OBJECTIVE      Medications    Current Facility-Administered Medications: levofloxacin (LEVAQUIN) tablet 750 mg, 750 mg, Oral, Daily  methadone (DOLOPHINE) tablet 80 mg, 80 mg, Oral, Daily  heparin 25,000 units in dextrose 5% 250 mL infusion, 17.8 mL/hr, Intravenous, Continuous  clonazePAM (KLONOPIN) tablet 0.5 mg, 0.5 mg, Oral, 4x Daily PRN  PARoxetine (PAXIL) tablet 10 mg, 10 mg, Oral, QAM  propranolol (INDERAL) tablet 20 mg, 20 mg, Oral, TID  sodium chloride flush 0.9 % injection 10 mL, 10 mL, Intravenous, 2 times per day  sodium chloride flush 0.9 % injection 10 mL, 10 mL, Intravenous, PRN  docusate sodium (COLACE) capsule 100 mg, 100 mg, Oral, BID  ondansetron (ZOFRAN) injection 4 mg, 4 mg, Intravenous, Q6H PRN  famotidine (PEPCID) tablet 20 mg, 20 mg, Oral, BID  influenza quadrivalent split vaccine (FLUZONE;FLUARIX;FLULAVAL;AFLURIA) injection 0.5 mL, 0.5 mL, Intramuscular, Once  ketorolac (TORADOL) injection 30 mg, 30 mg, Intravenous, Q6H PRN  Physical    VITALS:  /61   Pulse 74   Temp 97.5 °F (36.4 °C) (Oral)   Resp 16   Ht 5' 11\" (1.803 m)   Wt 211 lb 3.2 oz (95.8 kg)   SpO2 91%   BMI 29.46 kg/m²   CONSTITUTIONAL:  awake, alert, cooperative, no apparent distress, and appears stated age  LUNGS:  No increased work of breathing, good air exchange, clear to auscultation bilaterally, no crackles or wheezing  CARDIOVASCULAR:  Normal apical impulse, regular rate and rhythm, normal S1 and S2, no S3 or S4, and no murmur noted  ABDOMEN:  No scars, normal bowel sounds, soft, non-distended, non-tender, no masses palpated, no hepatosplenomegally  MUSCULOSKELETAL:  There is no redness, warmth, or swelling of the joints. Full range of motion noted. Motor strength is 5 out of 5 all extremities bilaterally.   Tone is normal.  Data CBC:   Lab Results   Component Value Date    WBC 4.3 11/10/2018    RBC 3.92 11/10/2018    HGB 11.6 11/10/2018    HCT 34.5 11/10/2018    MCV 88.0 11/10/2018    MCH 29.7 11/10/2018    MCHC 33.8 11/10/2018    RDW 13.5 11/10/2018     11/10/2018    MPV 9.1 11/10/2018     BMP:    Lab Results   Component Value Date     11/10/2018    K 3.9 11/10/2018    K 3.7 11/08/2018     11/10/2018    CO2 26 11/10/2018    BUN 8 11/10/2018    LABALBU 3.0 11/10/2018    CREATININE 0.7 11/10/2018    CALCIUM 8.4 11/10/2018    GFRAA >60 11/10/2018    GFRAA 147 12/03/2012    LABGLOM >60 11/10/2018    GLUCOSE 88 11/10/2018       ASSESSMENT AND PLAN      Patient Active Hospital Problem List:   PE (pulmonary thromboembolism) ([facility] [address] (11/8/2018) lab  Testing done, results pending, cause is not clear,   Has FHx of PE, suspect Factor V leiden or other genetic cause related PE. (Prot C,S)  LE doppler studies are neg. On heparin, plan to chage to Eliquis. Vital stable, O2 sat boderline low, 91 % onRA. Hx of drug abuse f/u by drug rehab. Cont meds,  Hiatal hernia, stable,  JES,Deprssion cont meds. Possible d/c on Sunday. Dr Miriam brown, covering dr Nuzhat Crockett

## 2018-11-10 NOTE — PROGRESS NOTES
lower extremity.        Signature         ASSESSMENT:         Patient Active Problem List   Diagnosis Code    Pulmonary embolus (HCC) I26.99    Anxiety F41.9    Chest pain R07.9    Back pain I50.6    Uncomplicated opioid dependence ([facility] [address] F11.20    Community acquired pneumonia J18.9    Hiatal hernia K44.9    Pleural effusion J90         PLAN:       Continue heparin for now. Dr. Luiza Avina had mentioned Eliquis, Coumadin to pt. Will need to check cost of Eliquis with his insurance.     Akil Pollack

## 2018-11-11 VITALS
RESPIRATION RATE: 16 BRPM | TEMPERATURE: 97.4 F | WEIGHT: 211.86 LBS | HEART RATE: 59 BPM | OXYGEN SATURATION: 95 % | DIASTOLIC BLOOD PRESSURE: 58 MMHG | HEIGHT: 71 IN | BODY MASS INDEX: 29.66 KG/M2 | SYSTOLIC BLOOD PRESSURE: 97 MMHG

## 2018-11-11 LAB
ANION GAP SERPL CALCULATED.3IONS-SCNC: 9 MMOL/L (ref 3–16)
APTT: 87 SEC (ref 26–36)
BASOPHILS ABSOLUTE: 0 K/UL (ref 0–0.2)
BASOPHILS RELATIVE PERCENT: 0.8 %
BUN BLDV-MCNC: 7 MG/DL (ref 7–20)
CALCIUM SERPL-MCNC: 8.6 MG/DL (ref 8.3–10.6)
CHLORIDE BLD-SCNC: 105 MMOL/L (ref 99–110)
CO2: 28 MMOL/L (ref 21–32)
CREAT SERPL-MCNC: 0.7 MG/DL (ref 0.9–1.3)
CULTURE, BLOOD 2: ABNORMAL
EOSINOPHILS ABSOLUTE: 0.3 K/UL (ref 0–0.6)
EOSINOPHILS RELATIVE PERCENT: 6.5 %
GFR AFRICAN AMERICAN: >60
GFR NON-AFRICAN AMERICAN: >60
GLUCOSE BLD-MCNC: 93 MG/DL (ref 70–99)
HCT VFR BLD CALC: 36.4 % (ref 40.5–52.5)
HEMOGLOBIN: 12.2 G/DL (ref 13.5–17.5)
LYMPHOCYTES ABSOLUTE: 2 K/UL (ref 1–5.1)
LYMPHOCYTES RELATIVE PERCENT: 42.1 %
MCH RBC QN AUTO: 29.4 PG (ref 26–34)
MCHC RBC AUTO-ENTMCNC: 33.5 G/DL (ref 31–36)
MCV RBC AUTO: 87.9 FL (ref 80–100)
MONOCYTES ABSOLUTE: 0.4 K/UL (ref 0–1.3)
MONOCYTES RELATIVE PERCENT: 8.6 %
NEUTROPHILS ABSOLUTE: 2 K/UL (ref 1.7–7.7)
NEUTROPHILS RELATIVE PERCENT: 42 %
ORGANISM: ABNORMAL
ORGANISM: ABNORMAL
PDW BLD-RTO: 13.3 % (ref 12.4–15.4)
PLATELET # BLD: 239 K/UL (ref 135–450)
PMV BLD AUTO: 8.4 FL (ref 5–10.5)
POTASSIUM SERPL-SCNC: 4.4 MMOL/L (ref 3.5–5.1)
PROCALCITONIN: 0.22 NG/ML (ref 0–0.15)
RBC # BLD: 4.14 M/UL (ref 4.2–5.9)
SODIUM BLD-SCNC: 142 MMOL/L (ref 136–145)
WBC # BLD: 4.8 K/UL (ref 4–11)

## 2018-11-11 PROCEDURE — 6370000000 HC RX 637 (ALT 250 FOR IP): Performed by: INTERNAL MEDICINE

## 2018-11-11 PROCEDURE — 80048 BASIC METABOLIC PNL TOTAL CA: CPT

## 2018-11-11 PROCEDURE — 84145 PROCALCITONIN (PCT): CPT

## 2018-11-11 PROCEDURE — 6360000002 HC RX W HCPCS: Performed by: INTERNAL MEDICINE

## 2018-11-11 PROCEDURE — 99231 SBSQ HOSP IP/OBS SF/LOW 25: CPT | Performed by: NURSE PRACTITIONER

## 2018-11-11 PROCEDURE — 6370000000 HC RX 637 (ALT 250 FOR IP): Performed by: SPECIALIST

## 2018-11-11 PROCEDURE — 2580000003 HC RX 258: Performed by: INTERNAL MEDICINE

## 2018-11-11 PROCEDURE — 6360000002 HC RX W HCPCS: Performed by: PHYSICIAN ASSISTANT

## 2018-11-11 PROCEDURE — 94760 N-INVAS EAR/PLS OXIMETRY 1: CPT

## 2018-11-11 PROCEDURE — 36415 COLL VENOUS BLD VENIPUNCTURE: CPT

## 2018-11-11 PROCEDURE — 85730 THROMBOPLASTIN TIME PARTIAL: CPT

## 2018-11-11 PROCEDURE — 85025 COMPLETE CBC W/AUTO DIFF WBC: CPT

## 2018-11-11 RX ORDER — LEVOFLOXACIN 750 MG/1
750 TABLET ORAL DAILY
Qty: 4 TABLET | Refills: 0 | Status: SHIPPED | OUTPATIENT
Start: 2018-11-11 | End: 2018-11-15

## 2018-11-11 RX ORDER — LEVOFLOXACIN 750 MG/1
750 TABLET ORAL DAILY
Qty: 7 TABLET | Refills: 0 | Status: SHIPPED | OUTPATIENT
Start: 2018-11-12 | End: 2018-11-11 | Stop reason: HOSPADM

## 2018-11-11 RX ADMIN — KETOROLAC TROMETHAMINE 30 MG: 30 INJECTION, SOLUTION INTRAMUSCULAR at 09:13

## 2018-11-11 RX ADMIN — ONDANSETRON 4 MG: 2 INJECTION INTRAMUSCULAR; INTRAVENOUS at 09:13

## 2018-11-11 RX ADMIN — HEPARIN SODIUM 17.8 ML/HR: 10000 INJECTION, SOLUTION INTRAVENOUS at 02:35

## 2018-11-11 RX ADMIN — METHADONE HYDROCHLORIDE 80 MG: 10 TABLET ORAL at 08:17

## 2018-11-11 RX ADMIN — LEVOFLOXACIN 750 MG: 500 TABLET, FILM COATED ORAL at 08:17

## 2018-11-11 RX ADMIN — DOCUSATE SODIUM 100 MG: 100 CAPSULE, LIQUID FILLED ORAL at 08:17

## 2018-11-11 RX ADMIN — CLONAZEPAM 0.5 MG: 0.5 TABLET ORAL at 09:14

## 2018-11-11 RX ADMIN — Medication 10 ML: at 08:18

## 2018-11-11 RX ADMIN — APIXABAN 10 MG: 5 TABLET, FILM COATED ORAL at 08:17

## 2018-11-11 RX ADMIN — PAROXETINE HYDROCHLORIDE 10 MG: 10 TABLET, FILM COATED ORAL at 08:17

## 2018-11-11 RX ADMIN — FAMOTIDINE 20 MG: 20 TABLET ORAL at 08:17

## 2018-11-11 ASSESSMENT — PAIN SCALES - GENERAL
PAINLEVEL_OUTOF10: 3
PAINLEVEL_OUTOF10: 4
PAINLEVEL_OUTOF10: 3

## 2018-11-11 NOTE — DISCHARGE SUMMARY
[address] [place]                                DISCHARGE SUMMARY    PATIENT NAME: Belkis De Oliveira                :        1985  MED REC NO:   [id number]                          ROOM:       46  ACCOUNT NO:   [de-identified]                           ADMIT DATE: 2018  PROVIDER:     Marisa Burgess MD                  DISCHARGE DATE:      DATE OF DISCHARGE:  2018    DISCHARGE DIAGNOSES:  Acute shortness of breath, pulmonary emboli,  history of drug abuse, anxiety, depression, GERD, desaturation, possible  pneumonia, tachycardia. DISCHARGE SUMMARY:  This is a 29-year-old male patient who came to the  Emergency Room because of increased shortness of breath and pleuritic  chest pain. The patient had a CT scan of the chest which showed  bilateral pulmonary emboli. The patient was started on heparin drip,  was consulted with Hematology and Pulmonary consultant. The patient had  blood work to rule out any underlying genetic disorder. The results are  pending. The patient is followed up with drug rehab clinic. The  patient had a venous Doppler study which was negative for any DVT. The  patient tolerated the medication well. He was switched to Eliquis per  protocol, 10 mg b.i.d. for seven days then 5 mg and then p.o. b.i.d. We  plan to do anticoagulation about 6 to 10 months. The patient's oxygen  saturation ran low, 91, 90, 92. The patient is going  to do some  breathing treatment at home. X-ray also was suspicious for possible  pneumonia or atelectasis. Blood culture was negative. The  procalcitonin was 0.22. The patient is on Levaquin, continuing that for  7 more days. The patient would like to go home and Oncology and  Pulmonary specialists okay or agree to discharge the patient today, this  afternoon.   The patient will follow up with Pulmonary, PCP, and  Hematology, follow up the lab results. CONDITION AT DISCHARGE:  Stable. COMPLICATIONS:  None. DISCHARGE MEDICATIONS:  See the reconciliation sheet. The patient will follow up with PCP, Hematology, Pulmonary as needed and  continue followup with drug rehab clinic. I spent more than 30 minutes for the discharge instructions and  paperwork.         Sravani Burgess MD    D: 11/11/2018 12:34:04       T: 11/11/2018 16:35:58     [email or web address]  Job#: [id number]     Doc#: [id number]    CC:  <>

## 2018-11-11 NOTE — PROGRESS NOTES
Clinical Pharmacy Note  Heparin Dosing       Lab Results   Component Value Date    APTT 87.0 11/11/2018     Lab Results   Component Value Date    HGB 12.2 11/11/2018    HCT 36.4 11/11/2018     11/11/2018    INR 1.11 11/08/2018       Current Infusion Rate: 17.8 mL/hr    Plan:  No change in rate. Continue: 17.8 mL/hr  D/C heparin drip at 09:00 when first dose of Eliquis is given per Dr. Silvano Davies.     Zack Man, PharmD, BCPS  11/11/2018  7:12 AM

## 2018-11-11 NOTE — DISCHARGE SUMMARY
Discussed DC instructions with patient and family. Discussed meds and next doses due as well as follow up appointments. IV and telemetry removed. Meds waiting at pharmacy. Patient dressed and belongings packed. Patient to leave via wheelchair to home with family and all belongings.

## 2018-11-11 NOTE — PLAN OF CARE
Problem: Pain:  Goal: Pain level will decrease  Pain level will decrease    Outcome: Ongoing  Pain/discomfort being managed with PRN analgesics per MD orders. Pt able to express presence and absence of pain and rate pain appropriately using numerical scale. Electronically signed by Harsh Ryder RN on 11/11/2018 at 12:10 AM          Problem: SAFETY  Goal: Free from accidental physical injury  Outcome: Ongoing  Patient assessed for fall risk; fall precautions initiated. Patient and family instructed about safety devices. Environment kept free of clutter and adequate lighting provided. Bed locked and in lowest position. Call light within reach.   Electronically signed by Harsh Ryder RN on 11/11/2018 at 12:10 AM

## 2018-11-13 LAB
ANTICARDIOLIPIN IGG ANTIBODY: 1 GPL (ref 0–14)
BETA-2 GLYCOPROTEIN 1 IGG ANTIBODY: 0 SGU (ref 0–20)
BETA-2 GLYCOPROTEIN 1 IGM ANTIBODY: 2 SMU (ref 0–20)
BLOOD CULTURE, ROUTINE: NORMAL
CARDIOLIPIN AB IGM: 6 MPL (ref 0–12)
DRVVT CONFIRMATION TEST: ABNORMAL RATIO
DRVVT SCREEN: 32 SEC (ref 33–44)
DRVVT,DIL: ABNORMAL SEC (ref 33–44)
HEXAGONAL PHOSPHOLIPID NEUTRALIZAT TEST: ABNORMAL
LUPUS ANTICOAG INTERP: ABNORMAL
PLT NEUTA: ABNORMAL
PROTEIN C ANTIGEN: 89 % (ref 63–153)
PROTEIN C FUNCTIONAL: 125 % (ref 83–168)
PROTEIN S ANTIGEN, TOTAL: 126 % (ref 84–134)
PT D: 14.2 SEC (ref 12–15.5)
PTT D: 69 SEC (ref 32–48)
PTT-D CORR REFLEX: ABNORMAL SEC (ref 32–48)
PTT-HEPARIN NEUTRALIZED: 48 SEC (ref 32–48)
REPTILASE TIME: 17.7 SEC
THROMBIN TIME: 22.4 SEC (ref 14.7–19.5)

## 2018-11-14 LAB
BLOOD CULTURE, ROUTINE: NORMAL
CULTURE, BLOOD 2: NORMAL
EKG ATRIAL RATE: 130 BPM
EKG DIAGNOSIS: NORMAL
EKG P AXIS: 35 DEGREES
EKG P-R INTERVAL: 116 MS
EKG Q-T INTERVAL: 310 MS
EKG QRS DURATION: 112 MS
EKG QTC CALCULATION (BAZETT): 456 MS
EKG R AXIS: 51 DEGREES
EKG T AXIS: 1 DEGREES
EKG VENTRICULAR RATE: 130 BPM
FACTOR V LEIDEN: NEGATIVE
PROTEIN S, FUNCTIONAL: 60 % (ref 66–143)
PROTHROMBIN G20210A MUTATION: NEGATIVE
PT PCR SPECIMEN: NORMAL
SPECIMEN: NORMAL

## 2021-04-19 NOTE — ED PROVIDER NOTES
Managed by GI at this time, wears depends. Worse in morning, Possibly related to dietary intake? Advised to continue followup with GI.    Marital status: Single     Spouse name: N/A    Number of children: N/A    Years of education: N/A     Social History Main Topics    Smoking status: Current Every Day Smoker     Packs/day: 1.00     Types: Cigars    Smokeless tobacco: Never Used      Comment: 2 cigars  per day    Alcohol use No    Drug use: Yes      Comment: hx/ of heroine abuse. none since Nov 2011    Sexual activity: Yes     Other Topics Concern    None     Social History Narrative    None       SCREENINGS    [place] Coma Scale  Eye Opening: Spontaneous  Best Verbal Response: Oriented  Best Motor Response: Obeys commands  Matt Coma Scale Score: 15        PHYSICAL EXAM    (up to 7 for level 4, 8 or more for level 5)     ED Triage Vitals [11/08/18 1132]   BP Temp Temp Source Pulse Resp SpO2 Height Weight   134/89 97.8 °F (36.6 °C) Oral 139 20 94 % 5' 10\" (1.778 m) 206 lb 2.1 oz (93.5 kg)       Physical Exam   Constitutional: He is oriented to person, place, and time. He appears well-developed and well-nourished. He is cooperative. Non-toxic appearance. He does not appear ill. No distress. HENT:   Head: Normocephalic and atraumatic. Eyes: Pupils are equal, round, and reactive to light. Conjunctivae are normal.   Cardiovascular: Regular rhythm. Tachycardia present. No edema, no tenderness   Pulmonary/Chest: Effort normal. No respiratory distress. Neurological: He is alert and oriented to person, place, and time. Skin: Skin is warm and dry. He is not diaphoretic. Psychiatric: His behavior is normal. Thought content normal. His mood appears anxious. Nursing note and vitals reviewed.       DIAGNOSTIC RESULTS   LABS:    Labs Reviewed   CULTURE BLOOD #2 - Abnormal; Notable for the following:        Result Value    Organism Staphylococcus coagulase negative DNA Detected (*)     Organism Staphylococcus coagulase-negative (*)     All other components within normal limits    Narrative:     ORDER#: [id number] Phone [phone]   TROPONIN    Narrative:     Performed at:  [facility]  [address] [facility] [place] [address]   Phone [phone]   BRAIN NATRIURETIC PEPTIDE    Narrative:     Performed at:  [facility]  [address], [place] [address]   Phone [phone]   LACTIC ACID, PLASMA    Narrative:     Performed at:  [facility]  [address] [place] [address]   Phone [phone]   PROTIME-INR    Narrative:     Performed at:  [facility]  [address] [place] [address]   Phone [phone]   FACTOR 5 LEIDEN    Narrative:     Collection has been rescheduled by Tomasz Solis at 11/09/2018 12:02. Reason:   Draw labs at 1300  Referred out by:  [facility]  [address] [place] [address]   Phone [phone]   PROTHROMBIN GENE MUTATION    Narrative:     Collection has been rescheduled by ABBEY at 11/09/2018 12:02. Reason:   Draw labs at 1300  Referred out by:  [facility]  [address] [place] [address]   Phone [phone]   PROTEIN C ANTIGEN, TOTAL    Narrative:     Collection has been rescheduled by Tomasz Solis at 11/09/2018 12:02. Reason:   Draw labs at 1300  Referred out by:  [facility]  [address] [place] [address]   Phone [phone]   PROTEIN C FUNCTIONAL    Narrative:     Collection has been rescheduled by Tomasz Solis at 11/09/2018 12:02. Reason:   Draw labs at 1300  Referred out by:  [facility]  [address], [place] [address]   Phone [phone]   PROTEIN S ANTIGEN, TOTAL    Narrative:     Collection has been rescheduled by Tomasz Solis at 11/09/2018 12:02.  Reason:   Draw labs at 1300  Referred out by:  [facility]  [address] [place] [address],

## 2024-08-01 ENCOUNTER — HOSPITAL ENCOUNTER (OUTPATIENT)
Dept: CT IMAGING | Age: 39
Discharge: HOME | End: 2024-08-01
Attending: STUDENT IN AN ORGANIZED HEALTH CARE EDUCATION/TRAINING PROGRAM
Payer: COMMERCIAL

## 2024-08-01 DIAGNOSIS — S92.012A CLOSED DISPLACED FRACTURE OF BODY OF LEFT CALCANEUS, INITIAL ENCOUNTER: ICD-10-CM

## 2024-08-01 PROCEDURE — 73700 CT LOWER EXTREMITY W/O DYE: CPT

## 2024-08-08 ENCOUNTER — TELEPHONE (OUTPATIENT)
Dept: CARDIOLOGY CLINIC | Age: 39
End: 2024-08-08

## 2024-08-08 DIAGNOSIS — R09.89 SUSPECTED DVT (DEEP VEIN THROMBOSIS): ICD-10-CM

## 2024-08-08 DIAGNOSIS — M79.662 PAIN OF LEFT CALF: Primary | ICD-10-CM

## 2024-08-08 NOTE — TELEPHONE ENCOUNTER
Per Dr. Lopez/Dr. Cruz, patient needs stat venous doppler of the left leg for DVT rule out.   Please assist with scheduling at earliest possible date. Order is placed as STAT. May try outreach locations including [place].  Thanks

## 2024-08-16 ENCOUNTER — HOSPITAL ENCOUNTER (OUTPATIENT)
Dept: VASCULAR LAB | Age: 39
End: 2024-08-16
Attending: INTERNAL MEDICINE
Payer: COMMERCIAL

## 2024-08-16 DIAGNOSIS — R09.89 SUSPECTED DVT (DEEP VEIN THROMBOSIS): ICD-10-CM

## 2024-08-16 DIAGNOSIS — M79.662 PAIN OF LEFT CALF: ICD-10-CM

## 2024-08-16 PROCEDURE — 93971 EXTREMITY STUDY: CPT

## 2024-08-16 PROCEDURE — 93971 EXTREMITY STUDY: CPT | Performed by: SURGERY
